# Patient Record
Sex: FEMALE | Race: WHITE | NOT HISPANIC OR LATINO | URBAN - METROPOLITAN AREA
[De-identification: names, ages, dates, MRNs, and addresses within clinical notes are randomized per-mention and may not be internally consistent; named-entity substitution may affect disease eponyms.]

---

## 2017-06-05 ENCOUNTER — AMBULATORY - HEALTHEAST (OUTPATIENT)
Dept: GERIATRICS | Facility: CLINIC | Age: 62
End: 2017-06-05

## 2017-06-06 ENCOUNTER — AMBULATORY - HEALTHEAST (OUTPATIENT)
Dept: ADMINISTRATIVE | Facility: CLINIC | Age: 62
End: 2017-06-06

## 2017-06-06 ENCOUNTER — OFFICE VISIT - HEALTHEAST (OUTPATIENT)
Dept: GERIATRICS | Facility: CLINIC | Age: 62
End: 2017-06-06

## 2017-06-06 DIAGNOSIS — E66.01 MORBID OBESITY DUE TO EXCESS CALORIES (H): ICD-10-CM

## 2017-06-06 DIAGNOSIS — H35.30 MACULAR DEGENERATION: ICD-10-CM

## 2017-06-06 DIAGNOSIS — E03.4 HYPOTHYROIDISM DUE TO ACQUIRED ATROPHY OF THYROID: ICD-10-CM

## 2017-06-06 DIAGNOSIS — I10 ESSENTIAL HYPERTENSION WITH GOAL BLOOD PRESSURE LESS THAN 140/90: ICD-10-CM

## 2017-06-06 DIAGNOSIS — Z79.01 CHRONIC ANTICOAGULATION: ICD-10-CM

## 2017-06-06 DIAGNOSIS — Z86.718 PERSONAL HISTORY OF DVT (DEEP VEIN THROMBOSIS): ICD-10-CM

## 2017-06-06 DIAGNOSIS — G47.33 OBSTRUCTIVE SLEEP APNEA ON CPAP: ICD-10-CM

## 2017-06-06 DIAGNOSIS — K21.9 GERD WITHOUT ESOPHAGITIS: ICD-10-CM

## 2017-06-06 DIAGNOSIS — Z96.652 H/O TOTAL KNEE REPLACEMENT, LEFT: ICD-10-CM

## 2017-06-06 DIAGNOSIS — M15.0 PRIMARY OSTEOARTHRITIS INVOLVING MULTIPLE JOINTS: ICD-10-CM

## 2017-06-06 RX ORDER — LEVOTHYROXINE SODIUM 137 UG/1
137 TABLET ORAL DAILY
Status: SHIPPED | COMMUNITY
Start: 2017-06-06

## 2017-06-06 RX ORDER — FERROUS GLUCONATE 324(38)MG
324 TABLET ORAL
Status: SHIPPED | COMMUNITY
Start: 2017-06-06

## 2017-06-06 RX ORDER — METRONIDAZOLE 10 MG/G
1 GEL TOPICAL 2 TIMES DAILY
Status: SHIPPED | COMMUNITY
Start: 2017-06-06

## 2017-06-06 RX ORDER — POTASSIUM CHLORIDE 750 MG/1
10 TABLET, EXTENDED RELEASE ORAL DAILY
Status: SHIPPED | COMMUNITY
Start: 2017-06-06

## 2017-06-06 RX ORDER — AMLODIPINE BESYLATE 5 MG/1
5 TABLET ORAL DAILY
Status: SHIPPED | COMMUNITY
Start: 2017-06-06

## 2017-06-06 RX ORDER — CHLORTHALIDONE 25 MG/1
25 TABLET ORAL DAILY
Status: SHIPPED | COMMUNITY
Start: 2017-06-06

## 2017-06-06 RX ORDER — OXYCODONE HYDROCHLORIDE 5 MG/1
5-10 TABLET ORAL EVERY 4 HOURS PRN
Status: SHIPPED | COMMUNITY
Start: 2017-06-06

## 2017-06-06 RX ORDER — AMOXICILLIN 250 MG
1 CAPSULE ORAL 2 TIMES DAILY
Status: SHIPPED | COMMUNITY
Start: 2017-06-06

## 2017-06-06 RX ORDER — TRIAMCINOLONE ACETONIDE 1 MG/G
CREAM TOPICAL 2 TIMES DAILY
Status: SHIPPED | COMMUNITY
Start: 2017-06-06

## 2017-06-06 RX ORDER — LISINOPRIL 40 MG/1
40 TABLET ORAL DAILY
Status: SHIPPED | COMMUNITY
Start: 2017-06-06

## 2017-06-06 RX ORDER — ACETAMINOPHEN 500 MG
1000 TABLET ORAL 3 TIMES DAILY
Status: SHIPPED | COMMUNITY
Start: 2017-06-06

## 2017-06-06 RX ORDER — GABAPENTIN 300 MG/1
300 CAPSULE ORAL 3 TIMES DAILY
Status: SHIPPED | COMMUNITY
Start: 2017-06-06

## 2017-06-06 RX ORDER — LISINOPRIL 5 MG/1
5 TABLET ORAL DAILY
Status: SHIPPED | COMMUNITY
Start: 2017-06-06

## 2017-06-08 ENCOUNTER — OFFICE VISIT - HEALTHEAST (OUTPATIENT)
Dept: GERIATRICS | Facility: CLINIC | Age: 62
End: 2017-06-08

## 2017-06-08 DIAGNOSIS — Z79.01 CHRONIC ANTICOAGULATION: ICD-10-CM

## 2017-06-08 DIAGNOSIS — K21.9 GERD WITHOUT ESOPHAGITIS: ICD-10-CM

## 2017-06-08 DIAGNOSIS — E66.01 MORBID OBESITY DUE TO EXCESS CALORIES (H): ICD-10-CM

## 2017-06-08 DIAGNOSIS — M15.0 PRIMARY OSTEOARTHRITIS INVOLVING MULTIPLE JOINTS: ICD-10-CM

## 2017-06-08 DIAGNOSIS — H35.30 MACULAR DEGENERATION: ICD-10-CM

## 2017-06-08 DIAGNOSIS — Z86.718 PERSONAL HISTORY OF DVT (DEEP VEIN THROMBOSIS): ICD-10-CM

## 2017-06-08 DIAGNOSIS — Z96.652 H/O TOTAL KNEE REPLACEMENT, LEFT: ICD-10-CM

## 2017-06-08 DIAGNOSIS — E03.4 HYPOTHYROIDISM DUE TO ACQUIRED ATROPHY OF THYROID: ICD-10-CM

## 2017-06-08 DIAGNOSIS — I10 ESSENTIAL HYPERTENSION WITH GOAL BLOOD PRESSURE LESS THAN 140/90: ICD-10-CM

## 2017-06-08 DIAGNOSIS — G47.33 OBSTRUCTIVE SLEEP APNEA ON CPAP: ICD-10-CM

## 2017-06-09 ENCOUNTER — AMBULATORY - HEALTHEAST (OUTPATIENT)
Dept: GERIATRICS | Facility: CLINIC | Age: 62
End: 2017-06-09

## 2017-06-13 ENCOUNTER — OFFICE VISIT - HEALTHEAST (OUTPATIENT)
Dept: GERIATRICS | Facility: CLINIC | Age: 62
End: 2017-06-13

## 2017-06-13 DIAGNOSIS — Z96.652 H/O TOTAL KNEE REPLACEMENT, LEFT: ICD-10-CM

## 2017-06-13 DIAGNOSIS — M15.0 PRIMARY OSTEOARTHRITIS INVOLVING MULTIPLE JOINTS: ICD-10-CM

## 2017-06-13 DIAGNOSIS — E66.01 MORBID OBESITY DUE TO EXCESS CALORIES (H): ICD-10-CM

## 2017-06-13 DIAGNOSIS — K21.9 GERD WITHOUT ESOPHAGITIS: ICD-10-CM

## 2017-06-13 DIAGNOSIS — G47.33 OBSTRUCTIVE SLEEP APNEA ON CPAP: ICD-10-CM

## 2017-06-13 DIAGNOSIS — H35.30 MACULAR DEGENERATION: ICD-10-CM

## 2017-06-13 DIAGNOSIS — Z79.01 CHRONIC ANTICOAGULATION: ICD-10-CM

## 2017-06-13 DIAGNOSIS — I10 ESSENTIAL HYPERTENSION WITH GOAL BLOOD PRESSURE LESS THAN 140/90: ICD-10-CM

## 2017-06-13 DIAGNOSIS — E03.4 HYPOTHYROIDISM DUE TO ACQUIRED ATROPHY OF THYROID: ICD-10-CM

## 2017-06-13 DIAGNOSIS — Z86.718 PERSONAL HISTORY OF DVT (DEEP VEIN THROMBOSIS): ICD-10-CM

## 2017-06-13 RX ORDER — BENZOCAINE/MENTHOL 6 MG-10 MG
1 LOZENGE MUCOUS MEMBRANE 2 TIMES DAILY
Status: SHIPPED | COMMUNITY
Start: 2017-06-13

## 2017-06-15 ENCOUNTER — AMBULATORY - HEALTHEAST (OUTPATIENT)
Dept: GERIATRICS | Facility: CLINIC | Age: 62
End: 2017-06-15

## 2017-06-15 ENCOUNTER — OFFICE VISIT - HEALTHEAST (OUTPATIENT)
Dept: GERIATRICS | Facility: CLINIC | Age: 62
End: 2017-06-15

## 2017-06-15 DIAGNOSIS — N39.0 URINARY TRACT INFECTION: ICD-10-CM

## 2017-06-15 DIAGNOSIS — I10 ESSENTIAL HYPERTENSION WITH GOAL BLOOD PRESSURE LESS THAN 140/90: ICD-10-CM

## 2017-06-15 DIAGNOSIS — Z79.01 CHRONIC ANTICOAGULATION: ICD-10-CM

## 2017-06-15 DIAGNOSIS — G47.33 OBSTRUCTIVE SLEEP APNEA ON CPAP: ICD-10-CM

## 2017-06-15 DIAGNOSIS — Z86.718 PERSONAL HISTORY OF DVT (DEEP VEIN THROMBOSIS): ICD-10-CM

## 2017-06-15 DIAGNOSIS — R60.9 EDEMA: ICD-10-CM

## 2017-06-15 DIAGNOSIS — Z96.652 H/O TOTAL KNEE REPLACEMENT, LEFT: ICD-10-CM

## 2017-06-15 DIAGNOSIS — M15.0 PRIMARY OSTEOARTHRITIS INVOLVING MULTIPLE JOINTS: ICD-10-CM

## 2017-06-15 DIAGNOSIS — H35.30 MACULAR DEGENERATION: ICD-10-CM

## 2017-06-15 DIAGNOSIS — E66.01 MORBID OBESITY DUE TO EXCESS CALORIES (H): ICD-10-CM

## 2017-06-15 DIAGNOSIS — E03.4 HYPOTHYROIDISM DUE TO ACQUIRED ATROPHY OF THYROID: ICD-10-CM

## 2017-06-15 DIAGNOSIS — K21.9 GERD WITHOUT ESOPHAGITIS: ICD-10-CM

## 2017-06-15 RX ORDER — CIPROFLOXACIN 250 MG/1
250 TABLET, FILM COATED ORAL 2 TIMES DAILY
Status: SHIPPED | COMMUNITY
Start: 2017-06-15

## 2021-05-31 VITALS — WEIGHT: 293 LBS

## 2021-06-11 NOTE — PROGRESS NOTES
Code Status:  FULL CODE  Visit Type: Discharge Summary     Facility:  [facility] [[id number]]         Facility Type: SNF (Skilled Nursing Facility, TCU)    History of Present Illness: Guerda Kendall is a 62 y.o. female who I am seeing today for discharge from the TCU.  Patient recently admitted status post left total knee arthroplasty on 5/31/2017 secondary to osteoarthritis.  She is moving quite well ambulating with a rolling walker which is standby assist.  Pain is well controlled oxycodone.  She has a history of spontaneous DVT ×2.  History of allergic reactions to both heparin and enoxaparin.  She is on chronic anticoagulation. Swelling in the operative side.  She does have chronic lymphedema and wears compression stockings.  Over the weekend she developed symptoms consistent with UTI.  Specimen obtained.  She grew 100,000 ESBL producing Klebsiella pneumoniae as well as 100,000 E. coli.  She was placed on Keflex.  Today she reports continued urinary frequency and some discomfort.  She is afebrile.  Review of the culture and sensitivity shows Keflex is resistive to ESBL.  Past medical history also includes hypertension, GERD, hypothyroidism, macular degeneration and obstructive sleep apnea on CPAP.        Active Ambulatory Problems     Diagnosis Date Noted     Essential hypertension      Hypothyroidism      GERD without esophagitis      Obstructive sleep apnea on CPAP      Morbid obesity      Personal history of DVT (deep vein thrombosis)      Primary osteoarthritis involving multiple joints      H/O total knee replacement, left 06/06/2017     Chronic anticoagulation      Macular degeneration      Resolved Ambulatory Problems     Diagnosis Date Noted     No Resolved Ambulatory Problems     Past Medical History:   Diagnosis Date     Arthritis      Chronic anticoagulation      Essential hypertension      GERD without esophagitis      Hypothyroidism      Lumbar spinal stenosis      Macular  degeneration      Morbid obesity      Obstructive sleep apnea on CPAP      Personal history of DVT (deep vein thrombosis)      Primary osteoarthritis involving multiple joints        Current Outpatient Prescriptions   Medication Sig     ciprofloxacin HCl (CIPRO) 250 MG tablet Take 250 mg by mouth 2 (two) times a day. For 3 days     acetaminophen (TYLENOL) 500 MG tablet Take 1,000 mg by mouth 3 (three) times a day. NTE 4000 mg in 24 hours     amLODIPine (NORVASC) 5 MG tablet Take 5 mg by mouth daily.     calcium carbonate-vit D3-min 600 mg calcium- 200 unit Tab Take 2 tablets by mouth at bedtime.     chlorthalidone (HYGROTEN) 25 MG tablet Take 25 mg by mouth daily.     cholecalciferol, vitamin D3, 2,000 unit Tab Take 1 tablet by mouth daily.     diclofenac sodium (VOLTAREN) 1 % Gel Apply 2 g topically 2 (two) times a day.     ferrous gluconate (FERGON) 324 MG tablet Take 324 mg by mouth daily with breakfast.     gabapentin (NEURONTIN) 300 MG capsule Take 300 mg by mouth 3 (three) times a day.     hydrocortisone 1 % cream Apply 1 application topically 2 (two) times a day.     levothyroxine (SYNTHROID, LEVOTHROID) 137 MCG tablet Take 137 mcg by mouth daily.     lisinopril (PRINIVIL,ZESTRIL) 40 MG tablet Take 40 mg by mouth daily.     lisinopril (PRINIVIL,ZESTRIL) 5 MG tablet Take 5 mg by mouth daily.     metroNIDAZOLE (METROGEL) 1 % gel Apply 1 application topically 2 (two) times a day.     omeprazole (PRILOSEC) 20 MG capsule Take 20 mg by mouth 2 (two) times a day before meals.      oxyCODONE (ROXICODONE) 5 MG immediate release tablet Take 5-10 mg by mouth every 4 (four) hours as needed for pain. For pain 1-5 give 5 mg  Or for Pain 6-10 give 10 mg every 4 hours as needed      potassium chloride SA (K-DUR,KLOR-CON) 10 MEQ tablet Take 10 mEq by mouth daily.     senna-docusate (PERICOLACE) 8.6-50 mg tablet Take 1 tablet by mouth 2 (two) times a day.     triamcinolone (KENALOG) 0.1 % cream Apply topically 2 (two) times a  day.     VITS A,C,E/LUTEIN/MINERALS (I-KAREY ORAL) Take 1 tablet by mouth daily.     WARFARIN SODIUM (WARFARIN ORAL) Take 6 mg by mouth daily. 6/9/17 INR 2.50 Cont 7.5mg qd. Next INR 6/13.  6/5/17 INR 2.03  Take 7.5mg daily.  Next INR 6/8/17.       Allergies   Allergen Reactions     Enoxaparin      Heparin          Review of Systems   No fevers or chills. No headache, lightheadedness or dizziness. No SOB, chest pains or palpitations. Appetite is good. No nausea, vomiting, constipation or diarrhea.  She continues to report urinary frequency and some discomfort with voiding feeling like she is retaining urine.  Chronic lower extremity edema.  Pain well controlled.      Physical Exam   PHYSICAL EXAMINATION:  Vital signs:   Vitals:    06/15/17 1338   BP: 130/64   Pulse: 61   Resp: 18   Temp: 98.8  F (37.1  C)   SpO2: 96%     General: Awake, Alert, oriented x3, appropriately, follows simple commands, conversant  HEENT:PERRLA, Pink conjunctiva, anicteric sclerae, moist oral mucosa  NECK: Supple, without any lymphadenopathy, or masses  CVS:  S1  S2, without murmur or gallop.   LUNG: Clear to auscultation, No wheezes, rales or rhonci.  BACK: No kyphosis of the thoracic spine  ABDOMEN: Soft, obese, nontender to palpation, with positive bowel sounds  EXTREMITIES: Moves both upper and lower extremities, 2+ pedal edema she does have compression stockings on, no calf tenderness.  She ambulates with a rolling walker independently.  She is moving quite well.  SKIN: Warm and dry, no rashes or erythema noted.  Incision not observed.  NEUROLOGIC: Intact, pulses palpable  PSYCHIATRIC: Cognition intact.  Somewhat angry and frustrated today.          Labs:   Recent Results (from the past 240 hour(s))   INR    Collection Time: 06/09/17  5:19 AM   Result Value Ref Range    INR 2.50 (H) 0.90 - 1.10   Urinalysis    Collection Time: 06/10/17  9:07 PM   Result Value Ref Range    Color, UA Yellow Colorless, Yellow, Straw, Light Yellow     Clarity, UA Hazy (!) Clear    Glucose, UA Negative Negative    Bilirubin, UA Negative Negative    Ketones, UA Negative Negative    Specific Gravity, UA 1.014 1.001 - 1.030    Blood, UA Small (!) Negative    pH, UA 6.5 4.5 - 8.0    Protein, UA Negative Negative mg/dL    Urobilinogen, UA <2.0 E.U./dL <2.0 E.U./dL, 2.0 E.U./dL    Nitrite, UA Positive (!) Negative    Leukocytes, UA Large (!) Negative    Bacteria, UA Many (!) None Seen hpf    RBC, UA 5-10 (!) None Seen, 0-2 hpf    WBC, UA  (!) None Seen, 0-5 hpf    Squam Epithel, UA 10-25 (!) None Seen, 0-5 lpf    WBC Clumps Present (!) None Seen    Mucus, UA Few (!) None Seen lpf   Culture, Urine    Collection Time: 06/10/17  9:07 PM   Result Value Ref Range    Culture >100,000 col/ml Escherichia coli (!)     Culture (!)      >100,000 col/ml ESBL Producing Klebsiella pneumoniae       Susceptibility    Escherichia coli - JASON     Amoxicillin + Clavulanate <=4/2 Sensitive      Ampicillin <=4 Sensitive      Cefazolin <=1 Sensitive      Cefepime <=1 Sensitive      Ceftriaxone <=1 Sensitive      Ciprofloxacin <=0.5 Sensitive      Gentamicin <=2 Sensitive      Levofloxacin <=1 Sensitive      Meropenem <=0.25 Sensitive      Nitrofurantoin <=16 Sensitive      Tetracycline <=2 Sensitive      Tobramycin <=2 Sensitive      Trimethoprim + Sulfamethoxazole >2/38 Resistant     ESBL Producing Klebsiella pneumoniae - JASON     Amoxicillin + Clavulanate <=4/2 Resistant      Ampicillin >16 Resistant      Cefazolin >16 Resistant      Cefepime >16 Resistant      Ceftriaxone >32 Resistant      Ciprofloxacin 1 Sensitive      Gentamicin <=2 Sensitive      Levofloxacin <=1 Sensitive      Meropenem <=0.25 Sensitive      Nitrofurantoin 64 Intermediate      Tetracycline >8 Resistant      Tobramycin <=2 Sensitive      Trimethoprim + Sulfamethoxazole <=0.5 Sensitive    INR    Collection Time: 06/13/17  6:10 AM   Result Value Ref Range    INR 2.98 (H) 0.90 - 1.10   INR    Collection Time:  06/15/17  7:52 AM   Result Value Ref Range    INR 2.52 (H) 0.90 - 1.10         Assessment/Plan:  1. Primary osteoarthritis involving multiple joints     2. H/O total knee replacement, left     3. Urinary tract infection     4. Morbid obesity due to excess calories     5. Essential hypertension with goal blood pressure less than 140/90     6. Hypothyroidism due to acquired atrophy of thyroid     7. Personal history of DVT (deep vein thrombosis)     8. Chronic anticoagulation     9. Obstructive sleep apnea on CPAP     10. Macular degeneration     11. GERD without esophagitis     12. Edema       Patient with history of osteoarthritis underwent left total knee arthroplasty.  Pain well-controlled with oxycodone.  She is using very little of this.  She is ambulating well. Recent UTI with current antibiotic being resistive. I will call in Cipro 250mg BID X 3 days for her. Chronic lower extremity edema on top of postsurgical edema. Compression stockings in place. Blood pressure well  controlled.  Shortness sleep apnea on CPAP.  History of DVT ×2 spontaneous.  She continues on chronic anticoagulation. INR 2.98. She will be changing antibiotics. Will decrease Coumadin to 6 mg QD. Follow up INR on Monday at her Clinic.     Pt will Discharge with current meds and treatment including narcotics.  Patient will do outpatient physical therapy.         35 minutes spent of which greater than 50 millimeters % was face to face communication with the patient about above plan of care    Electronically signed by: Ute Bob CNP

## 2021-06-11 NOTE — PROGRESS NOTES
[facility]    DATE: 2017    NAME: Guerda Kendall  : 1955           MR# [id number]     CODE STATUS:  FULL CODE      VISIT TYPE: Problem   FACILITY: [facility] [[id number]]    ROOM: 309    PRIMARY CARE PROVIDER: Sh[facility] Primary [facility] Provider Phone: None Fax:[phone]    History of Present Illness:   Guerda Kendall is a 62 y.o. female with 17 left total knee arthroplasty for osteoarthritis. She is very determined and I see her marching up and on the hallway persistently with little apparent discomfort. She is interested in discharge later this week when therapy feels they've maximized her range of motion in recovery  Acutely. She would like to discontinue the Lasix today saying that she has less swelling in her left leg and right leg she is on Keflex for positive urine culture and complains of some irritation of her perineum for which I'm using some 1% hydrocortisone cream and the rectal area. Overall she's very enthused about being able to go home and shows great determination. I'm seeing steady improvement in her range of motion and although there is ridges rule swelling in the left leg compared to the right she acknowledges steady improvement.    Past Medical History:  Past Medical History:   Diagnosis Date     Arthritis      Chronic anticoagulation      Essential hypertension      GERD without esophagitis      Hypothyroidism      Lumbar spinal stenosis      Macular degeneration      Morbid obesity      Obstructive sleep apnea on CPAP      Personal history of DVT (deep vein thrombosis)     x2     Primary osteoarthritis involving multiple joints        Allergies:  Allergies   Allergen Reactions     Enoxaparin      Heparin        Current Medications:  Current Outpatient Prescriptions   Medication Sig     acetaminophen (TYLENOL) 500 MG tablet Take 1,000 mg by mouth 3 (three) times a day. NTE 4000 mg in 24 hours     amLODIPine (NORVASC) 5 MG tablet  Take 5 mg by mouth daily.     calcium carbonate-vit D3-min 600 mg calcium- 200 unit Tab Take 2 tablets by mouth at bedtime.     cephalexin (KEFLEX) 500 MG capsule Take 500 mg by mouth 3 (three) times a day.     chlorthalidone (HYGROTEN) 25 MG tablet Take 25 mg by mouth daily.     cholecalciferol, vitamin D3, 2,000 unit Tab Take 1 tablet by mouth daily.     cyclobenzaprine (FLEXERIL) 10 MG tablet Take 10 mg by mouth every 8 (eight) hours as needed for muscle spasms.      diclofenac sodium (VOLTAREN) 1 % Gel Apply 2 g topically 2 (two) times a day.     ferrous gluconate (FERGON) 324 MG tablet Take 324 mg by mouth daily with breakfast.     gabapentin (NEURONTIN) 300 MG capsule Take 300 mg by mouth 3 (three) times a day.     hydrocortisone 1 % cream Apply 1 application topically 2 (two) times a day.     levothyroxine (SYNTHROID, LEVOTHROID) 137 MCG tablet Take 137 mcg by mouth daily.     lisinopril (PRINIVIL,ZESTRIL) 40 MG tablet Take 40 mg by mouth daily.     lisinopril (PRINIVIL,ZESTRIL) 5 MG tablet Take 5 mg by mouth daily.     metroNIDAZOLE (METROGEL) 1 % gel Apply 1 application topically 2 (two) times a day.     omeprazole (PRILOSEC) 20 MG capsule Take 20 mg by mouth 2 (two) times a day before meals.      oxyCODONE (ROXICODONE) 5 MG immediate release tablet Take 5-10 mg by mouth every 4 (four) hours as needed for pain. For pain 1-5 give 5 mg  Or for Pain 6-10 give 10 mg every 4 hours as needed      potassium chloride SA (K-DUR,KLOR-CON) 10 MEQ tablet Take 10 mEq by mouth daily.     senna-docusate (PERICOLACE) 8.6-50 mg tablet Take 1 tablet by mouth 2 (two) times a day.     triamcinolone (KENALOG) 0.1 % cream Apply topically 2 (two) times a day.     VITS A,C,E/LUTEIN/MINERALS (I-KAREY ORAL) Take 1 tablet by mouth daily.     WARFARIN SODIUM (WARFARIN ORAL) Take by mouth. 6/9/17 INR 2.50 Cont 7.5mg qd. Next INR 6/13.  6/5/17 INR 2.03  Take 7.5mg daily.  Next INR 6/8/17.       Review of Systems:  History obtained from  chart review and the patient  Respiratory ROS: no cough, shortness of breath, or wheezing  Cardiovascular ROS: no chest pain or dyspnea on exertion  Gastrointestinal ROS: no abdominal pain, change in bowel habits, or black or bloody stools  Genito-Urinary ROS: no dysuria, trouble voiding, or hematuria  Musculoskeletal ROS: Residual limitation of   motion and mild swelling in left lower extremity consistent with her postop status but skin actually looks good  Neurological ROS: no TIA or stroke symptoms  Dermatological ROS: excellent early healing of skin incision no redness suggestive of infection         Laboratory:  Recent Labs      06/13/17   0610   INR  2.98*     Continue current Coumadin regimen INR prior to discharge    Physical Examination:  /84  Pulse 76  Temp 98  F (36.7  C)  Resp 18  Wt (!) 300 lb 6.4 oz (136.3 kg)  SpO2 98%  General appearance: alert, appears stated age and cooperative  Neck: no adenopathy, no carotid bruit, no JVD, supple, symmetrical, trachea midline and thyroid not enlarged, symmetric, no tenderness/mass/nodules  Lungs: clear to auscultation bilaterally  Heart: regular rate and rhythm, S1, S2 normal, no murmur, click, rub or gallop  Abdomen: soft, non-tender; bowel sounds normal; no masses,  no organomegaly  Extremities:  Minimal swelling in the left lower extremity, well-heeled skin incision, range of motion at 0  to past 90        Impression:  Guerda Kendall is a 62 y.o. female with 5/31/17 left total knee arthroplasty    1. Primary osteoarthritis involving multiple joints     2. H/O total knee replacement, left     3. Morbid obesity due to excess calories     4. Essential hypertension with goal blood pressure less than 140/90     5. Personal history of DVT (deep vein thrombosis)     6. Chronic anticoagulation     7. Obstructive sleep apnea on CPAP     8. Macular degeneration     9. Hypothyroidism due to acquired atrophy of thyroid     10. GERD without esophagitis          Plan: I think she's on course for discharge later this week. We'll make sure she's not over anticoagulated but her returned activity is heartening.    Electronically signed by: Juanjose Scruggs Sr., MD

## 2021-06-11 NOTE — PROGRESS NOTES
[facility]    DATE: 2017  NAME: Guerda Kendall  : 1955           MR# [id number]     CODE STATUS:  FULL CODE  VISIT TYPE: Admission  [facility] [[id number][facility]    ROOM: 309  PRIMARY CARE PROVIDER: Dr Sirena Rodrigues MD    [facility]   Phone:        Fax:   History of Present Illness:   Guerda Kendall is a 62 y.o. female with 17 left total knee arthroplasty for osteoarthritis. She has an interesting past history of severe allergic reactions to [place] and enoxaparin. She is currently anticoagulated with Coumadin as she is chronically because of a history of two prior spontaneous DVT episodes. Also she's just begun treatment for macular degeneration with vitamins. Her postoperative course was uneventful. She has a occlusive dressing scheduled to be removed tomorrow and is noted significant swelling in the knee area quite consistent with the recent surgical intervention. Her most recent INR was in the therapeutic range so will continue the Coumadin 7.5 mg today. I have reordered her CPAP and added I Vites to a regimen.Today she has no complaints as long as we reassure her about the swelling in her knee which we have done.    Past Medical History:  Past Medical History:   Diagnosis Date     Arthritis      Chronic anticoagulation      Essential hypertension      GERD without esophagitis      Hypothyroidism      Lumbar spinal stenosis      Macular degeneration      Morbid obesity      Obstructive sleep apnea on CPAP      Personal history of DVT (deep vein thrombosis)     x2     Primary osteoarthritis involving multiple joints        Past Surgical History:  Past Surgical History:   Procedure Laterality Date     BLEPHAROPLASTY Bilateral 2015     BREAST BIOPSY       ENDOVENOUS ABLATION SAPHENOUS VEIN W/ LASER Right 10/08/2013     HAND SURGERY  2006    EXC LES TEND DONNY/JNT CAP HND/FNGR; f6, f4     LUMBAR LAMINECTOMY   2010     LUMBAR LAMINECTOMY  1973    LAMINECT W/EXPLOR; LUMBAR EX SPONDY      TOTAL HIP ARTHROPLASTY Right      TOTAL KNEE ARTHROPLASTY Left 05/31/2017       Allergies:  Allergies   Allergen Reactions     Enoxaparin      Heparin        Social History:  Social History     Social History     Marital status: N/A     Spouse name: N/A     Number of children: N/A     Years of education: N/A     Occupational History     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  Single [place], 1 Step into home and 8 steps in home.  6/2017       Family History:  Family History   Problem Relation Age of Onset     Osteoarthritis Mother      Osteoarthritis Father      Osteoarthritis Brother      Osteoarthritis Sister        Current Medications:  Current Outpatient Prescriptions   Medication Sig     acetaminophen (TYLENOL) 500 MG tablet Take 1,000 mg by mouth 3 (three) times a day. NTE 4000 mg in 24 hours     amLODIPine (NORVASC) 5 MG tablet Take 5 mg by mouth daily.     calcium carbonate-vit D3-min 600 mg calcium- 200 unit Tab Take 2 tablets by mouth at bedtime.     chlorthalidone (HYGROTEN) 25 MG tablet Take 25 mg by mouth daily.     cholecalciferol, vitamin D3, 2,000 unit Tab Take 1 tablet by mouth daily.     cyclobenzaprine (FLEXERIL) 10 MG tablet Take 10 mg by mouth every 8 (eight) hours as needed for muscle spasms.      diclofenac sodium (VOLTAREN) 1 % Gel Apply 2 g topically 2 (two) times a day.     ferrous gluconate (FERGON) 324 MG tablet Take 324 mg by mouth daily with breakfast.     furosemide (LASIX) 40 MG tablet Take 40 mg by mouth daily.     gabapentin (NEURONTIN) 300 MG capsule Take 300 mg by mouth 3 (three) times a  day.     levothyroxine (SYNTHROID, LEVOTHROID) 137 MCG tablet Take 137 mcg by mouth daily.     lisinopril (PRINIVIL,ZESTRIL) 40 MG tablet Take 40 mg by mouth daily.     lisinopril (PRINIVIL,ZESTRIL) 5 MG tablet Take 5 mg by mouth daily.     metroNIDAZOLE (METROGEL) 1 % gel Apply 1 application topically 2 (two) times a day.     omeprazole (PRILOSEC) 20 MG capsule Take 20 mg by mouth 2 (two) times a day before meals.      oxyCODONE (ROXICODONE) 5 MG immediate release tablet Take 5-10 mg by mouth every 4 (four) hours as needed for pain. For pain 1-5 give 5 mg  Or for Pain 6-10 give 10 mg every 4 hours as needed      potassium chloride SA (K-DUR,KLOR-CON) 10 MEQ tablet Take 10 mEq by mouth daily.     senna-docusate (PERICOLACE) 8.6-50 mg tablet Take 1 tablet by mouth 2 (two) times a day.     triamcinolone (KENALOG) 0.1 % cream Apply topically 2 (two) times a day.     VITS A,C,E/LUTEIN/MINERALS (I-KAREY ORAL) Take 1 tablet by mouth daily.     WARFARIN SODIUM (WARFARIN ORAL) Take by mouth. 6/5/17 INR 2.03  Take 7.5mg daily.  Next INR 6/8/17.       Review of Systems:  History obtained from chart review and the patient  General ROS: positive for  - fatigue  negative for - chills or fever  Psychological ROS: negative for - disorientation, hallucinations or memory difficulties  Ophthalmic ROS: negative for - blurry vision, decreased vision or Although she reports a recent onset of macular degeneration  ENT ROS: negative for - nasal congestion or sore throat  Breast ROS: negative for breast lumps  Respiratory ROS: no cough, shortness of breath, or wheezing  Cardiovascular ROS: no chest pain or dyspnea on exertion  Gastrointestinal ROS: no abdominal pain, change in bowel habits, or black or bloody stools  Genito-Urinary ROS: no dysuria, trouble voiding, or hematuria  Musculoskeletal ROS: Left knee is swollen and mildly tender after a strap is a move there is is swelling but no unusual tenderness the occlusive dressing applied by  surgery will be removed tomorrow as their orders suggest  Neurological ROS: no TIA or stroke symptoms  Dermatological ROS: her skin looks good although as noted above dressing is not scheduled to for removal until tomorrow       Physical Examination:  /70  Pulse (!) 59  Temp 97.5  F (36.4  C)  Resp 18  Wt (!) 304 lb 1.6 oz (137.9 kg)  SpO2 95%  General appearance: alert, appears stated age, cooperative and mild distress  Head: Normocephalic, without obvious abnormality, atraumatic  Eyes: conjunctivae/corneas clear. PERRL, EOM's intact. Fundi benign.  Nose: Nares normal. Septum midline. Mucosa normal. No drainage or sinus tenderness.  Throat: lips, mucosa, and tongue normal; teeth and gums normal  Neck: no adenopathy, no carotid bruit, no JVD, supple, symmetrical, trachea midline and thyroid not enlarged, symmetric, no tenderness/mass/nodules  Back: symmetric, no curvature. ROM normal. No CVA tenderness.  Lungs: clear to auscultation bilaterally  Breasts: normal appearance, no masses or tenderness  Heart: regular rate and rhythm, S1, S2 normal, no murmur, click, rub or gallop  Abdomen: soft, non-tender; bowel sounds normal; no masses,  no organomegaly and morbidly obese  Extremities: Left knee is swollen minimal redness in the area some evidence of bruising all normal postoperative changes again skin incision is not uncovered per surgeries written orders   Pulses: 2+ and symmetric  Skin: Skin color, texture, turgor normal. No rashes or lesions  Neurologic: Grossly normal       Impression:  Guerda Kendall is a 62 y.o. female with 5/31/17 left total knee arthroplasty good early recovery    1. Primary osteoarthritis involving multiple joints     2. H/O total knee replacement, left     3. Morbid obesity due to excess calories     4. Hypothyroidism due to acquired atrophy of thyroid     5. Personal history of DVT (deep vein thrombosis)     6. Obstructive sleep apnea on CPAP     7. GERD without esophagitis      8. Essential hypertension with goal blood pressure less than 140/90     9. Chronic anticoagulation     10. Macular degeneration           Plan: Continue encouraging full rehabilitation to get optimum outcome from surgical intervention. She has a very positive attitude and I would hope that she will regain full function with much less pain    Electronically signed by: Juanjose Scruggs Sr., MD

## 2021-06-16 PROBLEM — Z96.652 H/O TOTAL KNEE REPLACEMENT, LEFT: Status: ACTIVE | Noted: 2017-06-06
